# Patient Record
Sex: MALE | URBAN - METROPOLITAN AREA
[De-identification: names, ages, dates, MRNs, and addresses within clinical notes are randomized per-mention and may not be internally consistent; named-entity substitution may affect disease eponyms.]

---

## 2023-06-02 ENCOUNTER — TELEPHONE (OUTPATIENT)
Dept: INTERNAL MEDICINE | Facility: CLINIC | Age: 38
End: 2023-06-02
Payer: MEDICAID

## 2023-06-02 NOTE — TELEPHONE ENCOUNTER
----- Message from Megan Villela sent at 6/2/2023  4:28 PM CDT -----  Contact: pt/[phone]  Type: Appointment Request    Caller is requesting a sooner appointment.  Caller declined first available appointment listed below.  Caller will not accept being placed on the waitlist and is requesting a message be sent to doctor.  Name of Caller: Pt    When is the first available appointment? No  dates given    Symptoms: New  Medicaid    Would the patient rather a call back or a response via [facility]? Call   Best Call Back Number:[phone]  Additional Information: Per  pt  is  looking to  est  care also  pt  medicaid cards  has  this  MD  name  as PCP, please  call  pt  to  advise acceptance